# Patient Record
Sex: MALE | Race: WHITE | Employment: OTHER | URBAN - METROPOLITAN AREA
[De-identification: names, ages, dates, MRNs, and addresses within clinical notes are randomized per-mention and may not be internally consistent; named-entity substitution may affect disease eponyms.]

---

## 2024-09-30 ENCOUNTER — OFFICE VISIT (OUTPATIENT)
Dept: PRIMARY CARE CLINIC | Age: 74
End: 2024-09-30
Payer: MEDICARE

## 2024-09-30 ENCOUNTER — HOSPITAL ENCOUNTER (OUTPATIENT)
Age: 74
Setting detail: SPECIMEN
Discharge: HOME | End: 2024-09-30

## 2024-09-30 VITALS
HEART RATE: 64 BPM | HEIGHT: 70 IN | BODY MASS INDEX: 30.06 KG/M2 | DIASTOLIC BLOOD PRESSURE: 76 MMHG | TEMPERATURE: 97.4 F | SYSTOLIC BLOOD PRESSURE: 146 MMHG | WEIGHT: 210 LBS | OXYGEN SATURATION: 98 %

## 2024-09-30 DIAGNOSIS — R82.998 DARK BROWN-COLORED URINE: ICD-10-CM

## 2024-09-30 DIAGNOSIS — R30.0 DYSURIA: ICD-10-CM

## 2024-09-30 DIAGNOSIS — R30.0 DYSURIA: Primary | ICD-10-CM

## 2024-09-30 PROCEDURE — 99203 OFFICE O/P NEW LOW 30 MIN: CPT

## 2024-09-30 PROCEDURE — G8427 DOCREV CUR MEDS BY ELIG CLIN: HCPCS

## 2024-09-30 PROCEDURE — 4004F PT TOBACCO SCREEN RCVD TLK: CPT

## 2024-09-30 PROCEDURE — 1123F ACP DISCUSS/DSCN MKR DOCD: CPT

## 2024-09-30 PROCEDURE — G8417 CALC BMI ABV UP PARAM F/U: HCPCS

## 2024-09-30 RX ORDER — FINASTERIDE 5 MG/1
TABLET, FILM COATED ORAL
COMMUNITY

## 2024-09-30 RX ORDER — FLUTICASONE PROPIONATE AND SALMETEROL 250; 50 UG/1; UG/1
1 POWDER RESPIRATORY (INHALATION) 2 TIMES DAILY
COMMUNITY

## 2024-09-30 RX ORDER — EZETIMIBE 10 MG/1
10 TABLET ORAL DAILY
COMMUNITY
Start: 2023-11-24 | End: 2024-11-23

## 2024-09-30 RX ORDER — SEMAGLUTIDE 2.68 MG/ML
2 INJECTION, SOLUTION SUBCUTANEOUS
COMMUNITY
Start: 2024-02-12

## 2024-09-30 RX ORDER — MONTELUKAST SODIUM 10 MG/1
10 TABLET ORAL DAILY
COMMUNITY

## 2024-09-30 RX ORDER — CEPHALEXIN 500 MG/1
500 CAPSULE ORAL 2 TIMES DAILY
Qty: 14 CAPSULE | Refills: 0 | Status: SHIPPED | OUTPATIENT
Start: 2024-09-30 | End: 2024-10-07

## 2024-09-30 RX ORDER — ATORVASTATIN CALCIUM 80 MG/1
80 TABLET, FILM COATED ORAL DAILY
COMMUNITY
Start: 2023-12-04 | End: 2024-12-03

## 2024-09-30 RX ORDER — MIRABEGRON 25 MG/1
25 TABLET, FILM COATED, EXTENDED RELEASE ORAL
COMMUNITY
Start: 2024-02-12

## 2024-09-30 RX ORDER — CLOPIDOGREL BISULFATE 75 MG/1
75 TABLET ORAL DAILY
COMMUNITY
Start: 2023-11-28 | End: 2024-11-28

## 2024-09-30 RX ORDER — ALBUTEROL SULFATE 90 UG/1
2 INHALANT RESPIRATORY (INHALATION) EVERY 6 HOURS PRN
COMMUNITY

## 2024-09-30 ASSESSMENT — ENCOUNTER SYMPTOMS
CHOKING: 0
ABDOMINAL DISTENTION: 0
COLOR CHANGE: 0
SINUS PRESSURE: 0
DIARRHEA: 0
STRIDOR: 0
TROUBLE SWALLOWING: 0
CONSTIPATION: 0
BLOOD IN STOOL: 0
WHEEZING: 0
RESPIRATORY NEGATIVE: 1
ANAL BLEEDING: 0
CHEST TIGHTNESS: 0
NAUSEA: 0
SINUS PAIN: 0
SORE THROAT: 0
EYE DISCHARGE: 0
EYE PAIN: 0
VOICE CHANGE: 0
EYES NEGATIVE: 1
ABDOMINAL PAIN: 0
BACK PAIN: 0
RHINORRHEA: 0
GASTROINTESTINAL NEGATIVE: 1
EYE REDNESS: 0
COUGH: 0
APNEA: 0
EYE ITCHING: 0
VOMITING: 0
FACIAL SWELLING: 0
PHOTOPHOBIA: 0
SHORTNESS OF BREATH: 0
RECTAL PAIN: 0

## 2024-09-30 NOTE — PROGRESS NOTES
[facility], [facility] WALK IN [facility]ARE  [address]  [place] [address]    [facility] WALK IN [facility]  [address]  [place] [id number]  Dept: [phone]    Vince Duncan is a 74 y.o. male New patient, who presents to the walk-in clinic today with conditions/complaints as noted below:    Chief Complaint   Patient presents with    Urinary Pain     X3 days.          HPI:     Urinary Pain   This is a new problem. Episode onset: 3 days ago. The problem occurs every urination. The problem has been gradually worsening. The quality of the pain is described as burning. There has been no fever. He is Not sexually active. There is No history of pyelonephritis. Associated symptoms include frequency. Pertinent negatives include no chills, discharge, flank pain, hematuria, hesitancy, nausea, possible pregnancy, sweats, urgency or vomiting. He has tried nothing for the symptoms.     Patient is here visiting from [place].  He has had dysuria and frequency in the last few days.  Denies any other symptoms such as fevers, malaise, fatigue, muscle aches, weakness.  Pt is on Simvastatin X 10 years. He denies any recent abx. Trauma/injury to the bladder.     History reviewed. No pertinent past medical history.    Current Outpatient Medications   Medication Sig Dispense Refill    mirabegron (MYRBETRIQ) 25 MG TB24 1 tablet      semaglutide, 2 MG/DOSE, (OZEMPIC, 2 MG/DOSE,) 8 MG/3ML SOPN sc injection 2 mg      ezetimibe (ZETIA) 10 MG tablet Take 1 tablet by mouth daily      clopidogrel (PLAVIX) 75 MG tablet Take 1 tablet by mouth daily      atorvastatin (LIPITOR) 80 MG tablet Take 1 tablet by mouth daily      cephALEXin (KEFLEX) 500 MG capsule Take 1 capsule by mouth 2 times daily for 7 days 14 capsule 0    fluticasone-salmeterol (ADVAIR) 250-50 MCG/ACT AEPB diskus inhaler Inhale 1 puff into the lungs 2

## 2024-10-02 LAB
MICROORGANISM SPEC CULT: ABNORMAL
SERVICE CMNT-IMP: ABNORMAL
SPECIMEN DESCRIPTION: ABNORMAL

## 2024-10-02 NOTE — RESULT ENCOUNTER NOTE
Please let the patient know that his urine culture did show an active infection. The keflex should be working well for this, would recommend continuing with this treatment as prescribed.